# Patient Record
Sex: FEMALE | Race: WHITE
[De-identification: names, ages, dates, MRNs, and addresses within clinical notes are randomized per-mention and may not be internally consistent; named-entity substitution may affect disease eponyms.]

---

## 2018-04-28 ENCOUNTER — HOSPITAL ENCOUNTER (EMERGENCY)
Dept: HOSPITAL 52 - LL.ED | Age: 63
Discharge: HOME | End: 2018-04-28
Payer: COMMERCIAL

## 2018-04-28 DIAGNOSIS — I10: ICD-10-CM
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
## 2018-04-28 DIAGNOSIS — S61.301A: Primary | ICD-10-CM

## 2018-04-28 DIAGNOSIS — Z88.0: ICD-10-CM
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
## 2018-04-28 NOTE — EDM.PDOC
ED HPI GENERAL MEDICAL PROBLEM





- General


Chief Complaint: Laceration


Stated Complaint: laceration


Time Seen by Provider: 04/28/18 16:43


Source of Information: Reports: Patient


History Limitations: Reports: No Limitations





- History of Present Illness


INITIAL COMMENTS - FREE TEXT/NARRATIVE: 





Patient comes to ER due to skin injury left index finger while working outside.

  No loss of function, bleeding controlled. Tetanus UTD. No other injuries/

complaints. No numbness/tingling. 





BP elevated in ER. Felt to be elevated due to discomfort from wound as well as 

anxiety. 





- Related Data


 Allergies











Allergy/AdvReac Type Severity Reaction Status Date / Time


 


penicillin Allergy  Cannot Verified 08/08/15 14:03





   Remember  











Home Meds: 


 Home Meds





Ergocalciferol (Vitamin D2) [Vitamin D2] 2,000 units PO BID 04/28/18 [History]


Ubidecarenone [Co Q-10] 100 mg PO DAILY 04/28/18 [History]


Venlafaxine [Effexor] 75 mg PO DAILY 04/28/18 [History]


amLODIPine [Norvasc] 5 mg PO DAILY 04/28/18 [History]


traZODone 50 mg PO BEDTIME 04/28/18 [History]











Past Medical History


Cardiovascular History: Reports: High Cholesterol, Hypertension


Psychiatric History: Reports: Anxiety, Depression





Social & Family History





- Tobacco Use


Smoking Status *Q: Never Smoker


Second Hand Smoke Exposure: No





- Caffeine Use


Caffeine Use: Reports: None





- Recreational Drug Use


Recreational Drug Use: No





ED ROS GENERAL





- Review of Systems


Review Of Systems: ROS reveals no pertinent complaints other than HPI.





ED EXAM, SKIN/RASH


Exam: See Below


Exam Limited By: No Limitations


General Appearance: Alert, WD/WN, No Apparent Distress


Eye Exam: Bilateral Eye: EOMI, PERRL


Head: Atraumatic, Normocephalic


Respiratory/Chest: No Respiratory Distress


Extremities: Normal Range of Motion, Normal Capillary Refill


Neurological: Alert, Oriented, Normal Cognition, Normal Gait


Psychiatric: Normal Affect, Normal Mood


Skin: Other (small 10mm by 5mm area tip of left index finger where skin 

avulsed. No damage to tissue underneath. Tendon function intact. NVI. Hand and 

fingers otherwise normal in appearance. )





Course





- Vital Signs


Last Recorded V/S: 


 Last Vital Signs











Temp  37.5 C   04/28/18 16:20


 


Pulse  85   04/28/18 16:20


 


Resp  18   04/28/18 16:20


 


BP  181/95 H  04/28/18 16:20


 


Pulse Ox  98   04/28/18 16:20














- Orders/Labs/Meds


Meds: 


Medications














Discontinued Medications














Generic Name Dose Route Start Last Admin





  Trade Name Woodrow  PRN Reason Stop Dose Admin


 


Neomycin/Polymyxin/Bacitracin  Confirm  04/28/18 16:51  





  Triple Antibiotic Oint  Administered  04/28/18 16:52  





  Dose   





  1 each   





  .ROUTE   





  .STK-MED ONE   





     





     





     





     














- Re-Assessments/Exams


Free Text/Narrative Re-Assessment/Exam: 





04/28/18 17:02


Given the type of injury, unable to approximate wound edge margins together.  

Nonstick dressing/petroleum gauze dressing and antibiotic ointment applied by 

nursing staff.  Wound will need to granulate in.  Instructions/precautions 

regarding wound care reviewed with patient. 





BP rechecked and much improved prior to discharge. 








Patient declined pain medication in ER. Says she will instead take Tylenol once 

she gets home. 





Departure





- Departure


Time of Disposition: 16:50


Disposition: Home, Self-Care 01


Condition: Good


Clinical Impression: 


Avulsion of skin of finger


Qualifiers:


 Encounter type: initial encounter Qualified Code(s): S61.209A - Unspecified 

open wound of unspecified finger without damage to nail, initial encounter





Hypertension


Qualifiers:


 Hypertension type: unspecified Qualified Code(s): I10 - Essential (primary) 

hypertension








- Discharge Information


Instructions:  Deep Skin Avulsion


Referrals: 


Annalisa Christie, NP [Primary Care Provider] - 


Forms:  ED Department Discharge


Additional Instructions: 


Soak finger in normal saline for 10 minutes twice a day and then apply 

antibiotic ointment and dressing. Wound will granulate in and heal. Follow up 

if you have concern/signs of infection and get rechecked.

## 2022-03-31 ENCOUNTER — HOSPITAL ENCOUNTER (OUTPATIENT)
Dept: HOSPITAL 52 - LL.SDS | Age: 67
Discharge: HOME | End: 2022-03-31
Attending: SURGERY
Payer: MEDICARE

## 2022-03-31 DIAGNOSIS — N18.31: ICD-10-CM

## 2022-03-31 DIAGNOSIS — E55.9: ICD-10-CM

## 2022-03-31 DIAGNOSIS — M85.88: ICD-10-CM

## 2022-03-31 DIAGNOSIS — K58.0: ICD-10-CM

## 2022-03-31 DIAGNOSIS — D12.3: ICD-10-CM

## 2022-03-31 DIAGNOSIS — K57.30: ICD-10-CM

## 2022-03-31 DIAGNOSIS — Z12.11: Primary | ICD-10-CM

## 2022-03-31 DIAGNOSIS — E11.22: ICD-10-CM

## 2022-03-31 LAB
ANION GAP SERPL CALC-SCNC: 11.4 MEQ/L (ref 7–15)
CHLORIDE SERPL-SCNC: 107 MMOL/L (ref 98–107)
HBA1C MFR BLD: 6.1 % (ref 4.3–5.7)
SODIUM SERPL-SCNC: 140 MMOL/L (ref 136–145)